# Patient Record
(demographics unavailable — no encounter records)

---

## 2024-10-10 NOTE — HISTORY OF PRESENT ILLNESS
[FreeTextEntry1] : F/u for multiple medical issues  *** Oct 10, 2024 ***  feels ok overall , except for seeing more low numbers recently on and off stomach discomfort on Mounjaro Tresiba 28 un HS,  Tenzin ac B (9-32-75-14-15-16-17-18-19), ac L- (3-8-7-10-11-12-13-14-15), ac D (6-8-10-70-21-88-15-16-17), Farxiga 10 mg qd, Mounjaro 5 mg qw , Synthroid 75 mcg qd wearing Mgada Date of download:  10/10/2024  Diabetes Medications and Dosage: as above Indication for CGMS: verify a change in the treatment regimen, identify periods of hypoglycemia/ hyperglycemia.  Modal day report: pattern.  Pt with HYPO  % of the time ( NONE below 54),  % in target range Hyperglycemia:  % elevation  Identified issues: carbohydrate counting dates analyzed:  - 10/10/2024  *** Jul 10, 2024 ***  taking Tresiba 28 un HS, Tenzin ac B (5-86-47-14-15-16-17-18-19), ac L- (6-8-3-10-11-12-13-14-15), ac D (6-8-10-39-72-81-15-16-17),  Farxiga 10 mg qd, Mounjaro 5 mg qw , Synthroid 75 mcg wearing Magda 3 Date of download:  07/10/2024  Diabetes Medications and Dosage: as above Indication for CGMS: verify a change in the treatment regimen, identify periods of hypoglycemia/ hyperglycemia.  Modal day report: pattern.  Pt with HYPO  0% of the time ( NONE below 54),  84% in target range Hyperglycemia:  16% elevation  Identified issues: carbohydrate counting dates analyzed:  07/06/2024- 07/10/2024  DXA (5/8/24)_ LS (-0.3), FN (-0.6), radius 33% (-0.1). FRAX- 9.3% and 1.7% Thyroid US (5/8/24)- enlarged thyroid. with multiple b/l mostly spongiform nodules. Dominant LMP spongi 2.1x1.9x1.9 (TR1)  *** Apr 10, 2024 ***  doing well. denies any new c/o staying Tresiba 28 un HS,  Lyumjev ac B (6-16-61-14-15-16-17-18-19), ac L- (5-6-5-10-11-12-13-14-15), ac D (6-8-10-64-94-59-15-16-17),  Farxiga 10 mg qd , Mounjaro 2.5 mg qw tolerates Mounjaro well wearing Magda Date of download:  04/10/2024  Diabetes Medications and Dosage: as above Indication for CGMS: verify a change in the treatment regimen, identify periods of hypoglycemia/ hyperglycemia.  Modal day report: pattern.  Pt with HYPO  0% of the time ( NONE below 54),  84% in target range Hyperglycemia:  16% elevation  Identified issues: carbohydrate counting dates analyzed: 03/27/2024 - 04/10/2024  *** Oct 25, 2023 ***  doing ok overall, more nausea on Ozempic and feels that tolerates it less taking Tresiba 28 un HS,  Lyumjev ac B (4-04-89-14-15-16-17-18-19), ac L- (1-0-7-10-11-12-13-14-15), ac D (6-8-10-05-27-90-15-16-17), Farxiga 10 mg qd ,  Ozempic 1 mg qw, Synthroid 75 mcg wearing Magda Date of download:  10/25/2023  Diabetes Medications and Dosage: as above Indication for CGMS: verify a change in the treatment regimen, identify periods of hypoglycemia/ hyperglycemia.  Modal day report: pattern.  Pt with HYPO  0% of the time ( NONE below 54),  89% in target range Hyperglycemia:  11% elevation  Identified issues: carbohydrate counting dates analyzed: 10/12/2023 - 10/25/2023   *** Jul 11, 2023 ***  doing well. no new c/o staying on  Tresiba 28  un HS,  Lyumjev ac B  (8-10-13-14-15-16-17-18-19), ac L- (8-2-4-10-11-12-13-14-15), ac D (6-8-10-07-07-52-15-16-17),  Farxiga 10 mg qd ,  Ozempic 1 mg qw, Synthroid 75 mcg  using Magda 2, but forgot her reader reports fbs- 110's, ppg- 140's denies hypo's  *** Mar 22, 2023 ***  feels well, no new c/o taking  Tresiba 28  un HS,  Lyumjev ac B  (2-4-02-25-28-15-15-16-17), ac L- 8-1-3-10-11-12-13-14-15), ac D (8-10-13-15-16-17-18-19-20),  Farxiga 10 mg qd ,  Ozempic 1 mg qw  Date of download:  03/22/2023  Diabetes Medications and Dosage: as above Indication for CGMS: verify a change in the treatment regimen, identify periods of hypoglycemia/ hyperglycemia.  Modal day report: pattern.  Pt with HYPO  0% of the time ( NONE below 54),  74% in target range Hyperglycemia:  26% elevation  Identified issues: carbohydrate counting dates analyzed:03/13/2023  - 03/22/2023   Thyr US(12/12/22)-  diffusely heterogenous thyroid gland with numerous spongiform nodules, incl LMP 2.2x1.9 cm nodule  *** Nov 23, 2022 ***  feels well, denies any c/o. lost some weight taking  Tresiba 26 un HS,  Lyumjev tid ac (7-9-10-46-35-01-15-16-17),  Farxiga 10 mg qd,  Ozempic 1 mg qw , L-thyroxine 75 mcg wearing Magda Date of download:  11/23/2022  Diabetes Medications and Dosage: as above Indication for CGMS: verify a change in the treatment regimen, identify periods of hypoglycemia/ hyperglycemia.  Modal day report: pattern.  Pt with HYPO  0% of the time ( NONE below 54),  71% in target range Hyperglycemia:  29% elevation  Identified issues: carbohydrate counting dates analyzed:  11/10/2022- 11/23/2022  no repeat sono yet  *** Jul 08, 2022 ***  feels fine, no new c/o tolerates ozempic well did not repeat thyroid sono yet on  Tresiba 22 un HS,  Lyumjev tid ac (0-2-2-10-12-13-14-15-16),  Farxiga 10 mg qd,  Ozempic 0.5 mg qw, Synthroid 75 mcg,  Lipitor 80 mg, Metoprolol 25 mg, Cozaar 50 mg, Procardia XL 30 mg  wearing Magda  Date of download:  7/8/22 Diabetes Medications and Dosage: as above Indication for CGMS: verify a change in the treatment regimen, identify periods of hypoglycemia/ hyperglycemia.  Modal day report: pattern.  Pt with HYPO  0% of the time (NONE below 54),  66% in target range Hyperglycemia:  34% elevation  Identified issues: carbohydrate counting dates analyzed: 6/25/22-7/8/22   HISTORY OF PRESENT ILLNESS.   Ms. BENTLEY was diagnosed with Diabetes Mellitus Type 2 at the age of 58. She reports history HTN, dyslipidemia, CRI,  CAD (s/p PTCI x 7), CHF, Hashimoto's hypothyroidism, MNG, and denies known complications of retinopathy, or neuropathy. She is presently on Lantus 15 un HS (syringes),  Novolog  9 un  tid ac meals (syringes), Farxiga 10 mg , Synthroid 75 mcg, Lipitor 80 mg, Metoprolol 25 mg, Cozaar 50 mg, Procardia XL 30 mg. She was under care of Dr. Salcedo's a while ago, and was taking upto 100 units of Lantus a day. She's wearing Magda   Date of download:  3/25/22 Diabetes Medications and Dosage: as above Indication for CGMS: verify a change in the treatment regimen, identify periods of hypoglycemia/ hyperglycemia.  Modal day report: pattern.  Pt with HYPO  0% of the time (NONE below 54),  49% in target range Hyperglycemia:  51% elevation  Identified issues: carbohydrate counting dates analyzed: 3/12/22-3/25/22  Fingerstick glucose in the office today is 227 mg/dL 4 hours after eating (eggs, muffin, coffee).  Diet: not following ADA Exercise: none  Lab review: none available  Thyroid US (1/26/17) -- enlarged thyroid, multiple b/l nodules, including RUP  0.9 x 0.6 x 0.6 cm complex nodule, RMP 0.8 x 0.6 x 0.5 cm isoechoic nodule, RMP 0.7 x 0.7 x 0.6 cm heterogeneous nodule. Dominant LMP 1.4 x 1.9 x 1.1 cm (smaller in size), isthmic 1.0x0.7x0.6 hypo. All are stable  FNA (12/10/15) of the LMP 1.8 cm nodule- adenomatoid nodule with old hemorrhage    Thyroid US (12/7/15)- enlarged thyroid,  multiple b/l nodules, including RUP hypo complex 0.7x0.5x0.5, RMP hypo 0.8x0.7x0.7, dominant LMP iso solid 1.8x0.9x1.5, isthmic hypo solid 1.1x0.7x0.9   Last dilated eye - 01/24 Last podiatry visit  - 7/23 Last cardiology evaluation - Dr. Salvador ([facility]), 5/23 Last stress test - 12/21 Last 2-D Echo - 12/21 Last nephrology evaluation - 3/22 (Dr. Gavin Smalls) Last neurology evaluation- none

## 2024-10-10 NOTE — ASSESSMENT
[Diabetes Foot Care] : diabetes foot care [Long Term Vascular Complications] : long term vascular complications of diabetes [Carbohydrate Consistent Diet] : carbohydrate consistent diet [Importance of Diet and Exercise] : importance of diet and exercise to improve glycemic control, achieve weight loss and improve cardiovascular health [Exercise/Effect on Glucose] : exercise/effect on glucose [Hypoglycemia Management] : hypoglycemia management [Glucagon Use] : glucagon use [Ketone Testing] : ketone testing [Action and use of Insulin] : action and use of short and long-acting insulin [Self Monitoring of Blood Glucose] : self monitoring of blood glucose [Insulin Self-Administration] : insulin self-administration [Injection Technique, Storage, Sharps Disposal] : injection technique, storage, and sharps disposal [Sick-Day Management] : sick-day management [Retinopathy Screening] : Patient was referred to ophthalmology for retinopathy screening [Diabetic Medications] : Risks and benefits of diabetic medications were discussed [FreeTextEntry1] : 1. T2DM, reasonably controlled - labs today - decrease Tresiba 22 un HS - Lyumjev ac B (3-0-95-13-14-15-16-17-18), ac L- (3-4-5-8-4-2-9-10-11), ac D (2-3-1-8-9-10-11-12-13) can add 3 un of Lyumjev for a bedtime dessert - Farxiga 10 mg qd as per renal - Mounjaro 5 mg qw  (R+B).she'll try injecting into thighs, will switch to ac dinner) and get back to me in a couple of weeks. If no improvement, will decrease the dose to 2.5 mg qw - monitor BP, urine microalbumin, lipids - patient has decided against an insulin pump.  2. Hypothyroidism Proper intake of levothyroxine is reviewed in details, including on an empty stomach, with water only, at least one hour before taking any medications, vitamins, or supplements and three-four hours before taking iron or calcium. - Synthroid 75 mcg, pending labs  3. MNG - s/p benign FNA of the dominant left thyroid nodule - overall stable sono; will repeat thyroid US in 05/26 (2 yrs)  4. Screening for osteoporosis - calcium 500 mg bid, extra OTC vitamin D3 2,000 IU/day - continue weight-bearing exercises;  - DXA 3 sites in 05/26  RTC 3 mos

## 2025-01-23 NOTE — ASSESSMENT
[Diabetes Foot Care] : diabetes foot care [Long Term Vascular Complications] : long term vascular complications of diabetes [Carbohydrate Consistent Diet] : carbohydrate consistent diet [Importance of Diet and Exercise] : importance of diet and exercise to improve glycemic control, achieve weight loss and improve cardiovascular health [Exercise/Effect on Glucose] : exercise/effect on glucose [Hypoglycemia Management] : hypoglycemia management [Glucagon Use] : glucagon use [Ketone Testing] : ketone testing [Action and use of Insulin] : action and use of short and long-acting insulin [Self Monitoring of Blood Glucose] : self monitoring of blood glucose [Injection Technique, Storage, Sharps Disposal] : injection technique, storage, and sharps disposal [Insulin Self-Administration] : insulin self-administration [Sick-Day Management] : sick-day management [Retinopathy Screening] : Patient was referred to ophthalmology for retinopathy screening [Diabetic Medications] : Risks and benefits of diabetic medications were discussed [FreeTextEntry1] : 1. T2DM, suboptimally controlled - labs today - decrease Tresiba 24 un HS - Lyumjev ac B (7-67-62-15-17-18-19-20-21), ac L- (3-4-5-9-1-4-9-10-11), ac D (1-6-0-9-10-11-12-13-14) can add 3 un of Lyumjev for a bedtime dessert - Farxiga 10 mg qd as per renal - decrease Mounjaro 2.5 mg qw  (R+B).she'll try injecting into thighs ,instructed to get back to me in a couple of weeks  - monitor BP, urine microalbumin, lipids - patient has decided against an insulin pump.  2. Hypothyroidism Proper intake of levothyroxine is reviewed in details, including on an empty stomach, with water only, at least one hour before taking any medications, vitamins, or supplements and three-four hours before taking iron or calcium. - Synthroid 75 mcg, pending labs  3. MNG - s/p benign FNA of the dominant left thyroid nodule - overall stable sono; will repeat thyroid US in 05/26 (2 yrs)  4. Screening for osteoporosis - calcium 500 mg bid, extra OTC vitamin D3 2,000 IU/day - continue weight-bearing exercises;  - DXA 3 sites in 05/26  RTC 3 mos

## 2025-01-23 NOTE — HISTORY OF PRESENT ILLNESS
[FreeTextEntry1] : F/u for multiple medical issues  *** Jan 23, 2025 ***  taking Tresiba 22 un HS,  Lyumjev ac B (6-1-08-13-14-15-16-17-18), ac L- (3-4-5-2-2-7-9-10-11), ac D (3-6-5-8-9-10-11-12-13), Farxiga 10 mg qd, Mounjaro 5 mg qw , Synthroid 75 mcg qd,  still injecting into stomach (did not try thighs). still with on and off nausea, diarrhea  wearing Magda Date of download:  01/23/2025  Diabetes Medications and Dosage: as above Indication for CGMS: verify a change in the treatment regimen, identify periods of hypoglycemia/ hyperglycemia.  Modal day report: pattern.  Pt with HYPO  1% of the time ( NONE below 54),  76% in target range Hyperglycemia:  23% elevation  Identified issues: carbohydrate counting dates analyzed: 01/10/2025 - 01/23/2025  *** Oct 10, 2024 ***  feels ok overall , except for seeing more low numbers recently on and off stomach discomfort on Mounjaro Tresiba 28 un HS,  Chrisv ac B (5-43-71-14-15-16-17-18-19), ac L- (0-7-0-10-11-12-13-14-15), ac D (6-8-10-82-20-47-15-16-17), Farxiga 10 mg qd, Mounjaro 5 mg qw , Synthroid 75 mcg qd wearing Magda Date of download:  10/10/2024  Diabetes Medications and Dosage: as above Indication for CGMS: verify a change in the treatment regimen, identify periods of hypoglycemia/ hyperglycemia.  Modal day report: pattern.  Pt with HYPO  % of the time ( NONE below 54),  % in target range Hyperglycemia:  % elevation  Identified issues: carbohydrate counting dates analyzed:  - 10/10/2024  *** Jul 10, 2024 ***  taking  Tresiba 28 un HS (did not decrease the dose),  Lyumjev ac B (8-2-87-13-14-15-16-17-18), ac L- (3-4-5-7-4-3-9-10-11), ac D (5-9-1-8-9-10-11-12-13), Farxiga 10 mg qd, Mounjaro 5 mg qw , Synthroid 75 mcg wearing Magda 3 Date of download:  07/10/2024  Diabetes Medications and Dosage: as above Indication for CGMS: verify a change in the treatment regimen, identify periods of hypoglycemia/ hyperglycemia.  Modal day report: pattern.  Pt with HYPO  0% of the time ( NONE below 54),  84% in target range Hyperglycemia:  16% elevation  Identified issues: carbohydrate counting dates analyzed:  07/06/2024- 07/10/2024  DXA (5/8/24)_ LS (-0.3), FN (-0.6), radius 33% (-0.1). FRAX- 9.3% and 1.7% Thyroid US (5/8/24)- enlarged thyroid. with multiple b/l mostly spongiform nodules. Dominant LMP spongi 2.1x1.9x1.9 (TR1)  *** Apr 10, 2024 ***  doing well. denies any new c/o staying Tresiba 28 un HS,  Lyumjev ac B (4-55-10-14-15-16-17-18-19), ac L- (4-4-5-10-11-12-13-14-15), ac D (6-8-10-07-88-27-15-16-17),  Farxiga 10 mg qd , Mounjaro 2.5 mg qw tolerates Mounjaro well wearing Magda Date of download:  04/10/2024  Diabetes Medications and Dosage: as above Indication for CGMS: verify a change in the treatment regimen, identify periods of hypoglycemia/ hyperglycemia.  Modal day report: pattern.  Pt with HYPO  0% of the time ( NONE below 54),  84% in target range Hyperglycemia:  16% elevation  Identified issues: carbohydrate counting dates analyzed: 03/27/2024 - 04/10/2024  *** Oct 25, 2023 ***  doing ok overall, more nausea on Ozempic and feels that tolerates it less taking Tresiba 28 un HS,  Lyumjev ac B (8-58-26-14-15-16-17-18-19), ac L- (8-8-5-10-11-12-13-14-15), ac D (6-8-10-25-08-06-15-16-17), Farxiga 10 mg qd ,  Ozempic 1 mg qw, Synthroid 75 mcg wearing Magda Date of download:  10/25/2023  Diabetes Medications and Dosage: as above Indication for CGMS: verify a change in the treatment regimen, identify periods of hypoglycemia/ hyperglycemia.  Modal day report: pattern.  Pt with HYPO  0% of the time ( NONE below 54),  89% in target range Hyperglycemia:  11% elevation  Identified issues: carbohydrate counting dates analyzed: 10/12/2023 - 10/25/2023   *** Jul 11, 2023 ***  doing well. no new c/o staying on  Tresiba 28  un HS,  Lyumjev ac B  (8-10-13-14-15-16-17-18-19), ac L- (5-1-1-10-11-12-13-14-15), ac D (6-8-10-40-79-69-15-16-17),  Farxiga 10 mg qd ,  Ozempic 1 mg qw, Synthroid 75 mcg  using Magda 2, but forgot her reader reports fbs- 110's, ppg- 140's denies hypo's  *** Mar 22, 2023 ***  feels well, no new c/o taking  Tresiba 28  un HS,  Lyumjev ac B  (7-4-67-88-93-31-15-16-17), ac L- 4-6-5-10-11-12-13-14-15), ac D (8-10-13-15-16-17-18-19-20),  Farxiga 10 mg qd ,  Ozempic 1 mg qw  Date of download:  03/22/2023  Diabetes Medications and Dosage: as above Indication for CGMS: verify a change in the treatment regimen, identify periods of hypoglycemia/ hyperglycemia.  Modal day report: pattern.  Pt with HYPO  0% of the time ( NONE below 54),  74% in target range Hyperglycemia:  26% elevation  Identified issues: carbohydrate counting dates analyzed:03/13/2023  - 03/22/2023   Thyr US(12/12/22)-  diffusely heterogenous thyroid gland with numerous spongiform nodules, incl LMP 2.2x1.9 cm nodule  *** Nov 23, 2022 ***  feels well, denies any c/o. lost some weight taking  Tresiba 26 un HS,  Lyumjev tid ac (7-9-10-53-41-31-15-16-17),  Farxiga 10 mg qd,  Ozempic 1 mg qw , L-thyroxine 75 mcg wearing Magda Date of download:  11/23/2022  Diabetes Medications and Dosage: as above Indication for CGMS: verify a change in the treatment regimen, identify periods of hypoglycemia/ hyperglycemia.  Modal day report: pattern.  Pt with HYPO  0% of the time ( NONE below 54),  71% in target range Hyperglycemia:  29% elevation  Identified issues: carbohydrate counting dates analyzed:  11/10/2022- 11/23/2022  no repeat sono yet  *** Jul 08, 2022 ***  feels fine, no new c/o tolerates ozempic well did not repeat thyroid sono yet on  Tresiba 22 un HS,  Lyumjev tid ac (2-1-8-10-12-13-14-15-16),  Farxiga 10 mg qd,  Ozempic 0.5 mg qw, Synthroid 75 mcg,  Lipitor 80 mg, Metoprolol 25 mg, Cozaar 50 mg, Procardia XL 30 mg  wearing Magda  Date of download:  7/8/22 Diabetes Medications and Dosage: as above Indication for CGMS: verify a change in the treatment regimen, identify periods of hypoglycemia/ hyperglycemia.  Modal day report: pattern.  Pt with HYPO  0% of the time (NONE below 54),  66% in target range Hyperglycemia:  34% elevation  Identified issues: carbohydrate counting dates analyzed: 6/25/22-7/8/22   HISTORY OF PRESENT ILLNESS.   Ms. BENTLEY was diagnosed with Diabetes Mellitus Type 2 at the age of 58. She reports history HTN, dyslipidemia, CRI,  CAD (s/p PTCI x 7), CHF, Hashimoto's hypothyroidism, MNG, and denies known complications of retinopathy, or neuropathy. She is presently on Lantus 15 un HS (syringes),  Novolog  9 un  tid ac meals (syringes), Farxiga 10 mg , Synthroid 75 mcg, Lipitor 80 mg, Metoprolol 25 mg, Cozaar 50 mg, Procardia XL 30 mg. She was under care of Dr. Salcedo's a while ago, and was taking upto 100 units of Lantus a day. She's wearing Magda   Date of download:  3/25/22 Diabetes Medications and Dosage: as above Indication for CGMS: verify a change in the treatment regimen, identify periods of hypoglycemia/ hyperglycemia.  Modal day report: pattern.  Pt with HYPO  0% of the time (NONE below 54),  49% in target range Hyperglycemia:  51% elevation  Identified issues: carbohydrate counting dates analyzed: 3/12/22-3/25/22  Fingerstick glucose in the office today is 227 mg/dL 4 hours after eating (eggs, muffin, coffee).  Diet: not following ADA Exercise: none  Lab review: none available  Thyroid US (1/26/17) -- enlarged thyroid, multiple b/l nodules, including RUP  0.9 x 0.6 x 0.6 cm complex nodule, RMP 0.8 x 0.6 x 0.5 cm isoechoic nodule, RMP 0.7 x 0.7 x 0.6 cm heterogeneous nodule. Dominant LMP 1.4 x 1.9 x 1.1 cm (smaller in size), isthmic 1.0x0.7x0.6 hypo. All are stable  FNA (12/10/15) of the LMP 1.8 cm nodule- adenomatoid nodule with old hemorrhage    Thyroid US (12/7/15)- enlarged thyroid,  multiple b/l nodules, including RUP hypo complex 0.7x0.5x0.5, RMP hypo 0.8x0.7x0.7, dominant LMP iso solid 1.8x0.9x1.5, isthmic hypo solid 1.1x0.7x0.9   Last dilated eye - 01/24 (Dr. Rajput) , to see retina in 02/25 (Dr. Brooks) Last podiatry visit  - 7/23 Last cardiology evaluation - Dr. Salvador ([facility]), 5/23 Last stress test - 12/21 Last 2-D Echo - 12/21 Last nephrology evaluation - 3/22 (Dr. Gavin Smalls) Last neurology evaluation- none

## 2025-03-26 NOTE — ASSESSMENT
[Diabetes Foot Care] : diabetes foot care [Long Term Vascular Complications] : long term vascular complications of diabetes [Carbohydrate Consistent Diet] : carbohydrate consistent diet [Importance of Diet and Exercise] : importance of diet and exercise to improve glycemic control, achieve weight loss and improve cardiovascular health [Exercise/Effect on Glucose] : exercise/effect on glucose [Hypoglycemia Management] : hypoglycemia management [Glucagon Use] : glucagon use [Ketone Testing] : ketone testing [Action and use of Insulin] : action and use of short and long-acting insulin [Self Monitoring of Blood Glucose] : self monitoring of blood glucose [Insulin Self-Administration] : insulin self-administration [Injection Technique, Storage, Sharps Disposal] : injection technique, storage, and sharps disposal [Sick-Day Management] : sick-day management [Retinopathy Screening] : Patient was referred to ophthalmology for retinopathy screening [Diabetic Medications] : Risks and benefits of diabetic medications were discussed [FreeTextEntry1] : 1. T2DM, suboptimally controlled - labs today - Tresiba 24 un HS - Lyumjev ac B (10-13-15-57-51-09-22-23-24), ac L- (3-4-5-3-8-8-9-10-11), ac D (8-9-10-41-58-43-14-15-16) can add 3 un of Lyumjev for a bedtime dessert - should inject prandial insulin BEFORE meals - Farxiga 10 mg qd as per renal - can try Mounjaro 2.5 mg qow  (R+B).she'll try injecting into thighs ,instructed to get back to me in a couple of weeks  - US abdomen and see GI - monitor BP, urine microalbumin, lipids - patient has decided against an insulin pump.  2. Hypothyroidism Proper intake of levothyroxine is reviewed in details, including on an empty stomach, with water only, at least one hour before taking any medications, vitamins, or supplements and three-four hours before taking iron or calcium. - Synthroid 75 mcg 6/wk, pending labs  3. MNG - s/p benign FNA of the dominant left thyroid nodule - overall stable sono; will repeat thyroid US in 05/26 (2 yrs)  4. Screening for osteoporosis - calcium 500 mg bid, extra OTC vitamin D3 2,000 IU/day - continue weight-bearing exercises;  - DXA 3 sites in 05/26  RTC as scheduled

## 2025-03-26 NOTE — HISTORY OF PRESENT ILLNESS
[FreeTextEntry1] : F/u for multiple medical issues  *** Mar 26, 2025 ***  saw renal, Dr. Smalls- creatinine rising to 3.2. Kerendia was stopped, Entresto changed to 24-26 bid labs from 2/3/25- TSH- 0.09, FT4- 1.34, creat- 2.7, LDL- 106 alternating constipation and diarrhea. some generalized abd discomfort on Tresiba 24 un HS,  Lyumjev ac B (3-46-41-15-17-18-19-20-21), ac L- (3-4-5-7-1-6-9-10-11), ac D (2-0-8-9-10-11-12-13-14), Farxiga 10 mg qd, Mounjaro 2.5 mg qw , Synthroid 75 mcg qd 6/wk ( dose decreased)  wearing Magda Date of download:  03/26/2025  Diabetes Medications and Dosage: as above Indication for CGMS: verify a change in the treatment regimen, identify periods of hypoglycemia/ hyperglycemia.  Modal day report: pattern.  Pt with HYPO  1% of the time ( NONE below 54),  77% in target range Hyperglycemia: 1% elevation  Identified issues: carbohydrate counting. late injections  (post meals) dates analyzed: 03/13/2025 - 03/26/2025  *** Jan 23, 2025 ***  taking Tresiba 22 un HS,  Lyumjev ac B (5-0-35-13-14-15-16-17-18), ac L- (3-4-5-6-6-1-9-10-11), ac D (7-2-1-8-9-10-11-12-13), Farxiga 10 mg qd, Mounjaro 5 mg qw , Synthroid 75 mcg qd,  still injecting into stomach (did not try thighs). still with on and off nausea, diarrhea  wearing Magda Date of download:  01/23/2025  Diabetes Medications and Dosage: as above Indication for CGMS: verify a change in the treatment regimen, identify periods of hypoglycemia/ hyperglycemia.  Modal day report: pattern.  Pt with HYPO  1% of the time ( NONE below 54),  76% in target range Hyperglycemia:  23% elevation  Identified issues: carbohydrate counting dates analyzed: 01/10/2025 - 01/23/2025  *** Oct 10, 2024 ***  feels ok overall , except for seeing more low numbers recently on and off stomach discomfort on Mounjaro Tresiba 28 un HS,  Lysierrav ac B (0-34-83-14-15-16-17-18-19), ac L- (2-7-8-10-11-12-13-14-15), ac D (6-8-10-28-62-72-15-16-17), Farxiga 10 mg qd, Mounjaro 5 mg qw , Synthroid 75 mcg qd wearing Magda Date of download:  10/10/2024  Diabetes Medications and Dosage: as above Indication for CGMS: verify a change in the treatment regimen, identify periods of hypoglycemia/ hyperglycemia.  Modal day report: pattern.  Pt with HYPO  % of the time ( NONE below 54),  % in target range Hyperglycemia:  % elevation  Identified issues: carbohydrate counting dates analyzed:  - 10/10/2024  *** Jul 10, 2024 ***  taking  Tresiba 28 un HS (did not decrease the dose),  Lyumjev ac B (0-7-00-13-14-15-16-17-18), ac L- (3-4-5-3-2-3-9-10-11), ac D (1-1-4-8-9-10-11-12-13), Farxiga 10 mg qd, Mounjaro 5 mg qw , Synthroid 75 mcg wearing Magda 3 Date of download:  07/10/2024  Diabetes Medications and Dosage: as above Indication for CGMS: verify a change in the treatment regimen, identify periods of hypoglycemia/ hyperglycemia.  Modal day report: pattern.  Pt with HYPO  0% of the time ( NONE below 54),  84% in target range Hyperglycemia:  16% elevation  Identified issues: carbohydrate counting dates analyzed:  07/06/2024- 07/10/2024  DXA (5/8/24)_ LS (-0.3), FN (-0.6), radius 33% (-0.1). FRAX- 9.3% and 1.7% Thyroid US (5/8/24)- enlarged thyroid. with multiple b/l mostly spongiform nodules. Dominant LMP spongi 2.1x1.9x1.9 (TR1)  *** Apr 10, 2024 ***  doing well. denies any new c/o staying Tresiba 28 un HS,  Lyumjev ac B (7-22-94-14-15-16-17-18-19), ac L- (4-0-1-10-11-12-13-14-15), ac D (6-8-10-54-64-27-15-16-17),  Farxiga 10 mg qd , Mounjaro 2.5 mg qw tolerates Mounjaro well wearing Magda Date of download:  04/10/2024  Diabetes Medications and Dosage: as above Indication for CGMS: verify a change in the treatment regimen, identify periods of hypoglycemia/ hyperglycemia.  Modal day report: pattern.  Pt with HYPO  0% of the time ( NONE below 54),  84% in target range Hyperglycemia:  16% elevation  Identified issues: carbohydrate counting dates analyzed: 03/27/2024 - 04/10/2024  *** Oct 25, 2023 ***  doing ok overall, more nausea on Ozempic and feels that tolerates it less taking Tresiba 28 un HS,  Lyumjev ac B (0-77-43-14-15-16-17-18-19), ac L- (9-9-0-10-11-12-13-14-15), ac D (6-8-10-98-05-05-15-16-17), Farxiga 10 mg qd ,  Ozempic 1 mg qw, Synthroid 75 mcg wearing Magda Date of download:  10/25/2023  Diabetes Medications and Dosage: as above Indication for CGMS: verify a change in the treatment regimen, identify periods of hypoglycemia/ hyperglycemia.  Modal day report: pattern.  Pt with HYPO  0% of the time ( NONE below 54),  89% in target range Hyperglycemia:  11% elevation  Identified issues: carbohydrate counting dates analyzed: 10/12/2023 - 10/25/2023   *** Jul 11, 2023 ***  doing well. no new c/o staying on  Tresiba 28  un HS,  Lyumjev ac B  (8-10-13-14-15-16-17-18-19), ac L- (5-2-4-10-11-12-13-14-15), ac D (6-8-10-68-21-41-15-16-17),  Farxiga 10 mg qd ,  Ozempic 1 mg qw, Synthroid 75 mcg  using Magda 2, but forgot her reader reports fbs- 110's, ppg- 140's denies hypo's  *** Mar 22, 2023 ***  feels well, no new c/o taking  Tresiba 28  un HS,  Lyumjev ac B  (3-3-91-58-73-70-15-16-17), ac L- 5-5-2-10-11-12-13-14-15), ac D (8-10-13-15-16-17-18-19-20),  Farxiga 10 mg qd ,  Ozempic 1 mg qw  Date of download:  03/22/2023  Diabetes Medications and Dosage: as above Indication for CGMS: verify a change in the treatment regimen, identify periods of hypoglycemia/ hyperglycemia.  Modal day report: pattern.  Pt with HYPO  0% of the time ( NONE below 54),  74% in target range Hyperglycemia:  26% elevation  Identified issues: carbohydrate counting dates analyzed:03/13/2023  - 03/22/2023   Thyr US(12/12/22)-  diffusely heterogenous thyroid gland with numerous spongiform nodules, incl LMP 2.2x1.9 cm nodule  *** Nov 23, 2022 ***  feels well, denies any c/o. lost some weight taking  Tresiba 26 un HS,  Lyumjev tid ac (7-9-10-75-74-09-15-16-17),  Farxiga 10 mg qd,  Ozempic 1 mg qw , L-thyroxine 75 mcg wearing Magda Date of download:  11/23/2022  Diabetes Medications and Dosage: as above Indication for CGMS: verify a change in the treatment regimen, identify periods of hypoglycemia/ hyperglycemia.  Modal day report: pattern.  Pt with HYPO  0% of the time ( NONE below 54),  71% in target range Hyperglycemia:  29% elevation  Identified issues: carbohydrate counting dates analyzed:  11/10/2022- 11/23/2022  no repeat sono yet  *** Jul 08, 2022 ***  feels fine, no new c/o tolerates ozempic well did not repeat thyroid sono yet on  Tresiba 22 un HS,  Lyumjev tid ac (0-2-6-10-12-13-14-15-16),  Farxiga 10 mg qd,  Ozempic 0.5 mg qw, Synthroid 75 mcg,  Lipitor 80 mg, Metoprolol 25 mg, Cozaar 50 mg, Procardia XL 30 mg  wearing Magda  Date of download:  7/8/22 Diabetes Medications and Dosage: as above Indication for CGMS: verify a change in the treatment regimen, identify periods of hypoglycemia/ hyperglycemia.  Modal day report: pattern.  Pt with HYPO  0% of the time (NONE below 54),  66% in target range Hyperglycemia:  34% elevation  Identified issues: carbohydrate counting dates analyzed: 6/25/22-7/8/22   HISTORY OF PRESENT ILLNESS.   Ms. BENTLEY was diagnosed with Diabetes Mellitus Type 2 at the age of 58. She reports history HTN, dyslipidemia, CRI,  CAD (s/p PTCI x 7), CHF, Hashimoto's hypothyroidism, MNG, and denies known complications of retinopathy, or neuropathy. She is presently on Lantus 15 un HS (syringes),  Novolog  9 un  tid ac meals (syringes), Farxiga 10 mg , Synthroid 75 mcg, Lipitor 80 mg, Metoprolol 25 mg, Cozaar 50 mg, Procardia XL 30 mg. She was under care of Dr. Salcedo's a while ago, and was taking upto 100 units of Lantus a day. She's wearing Magda   Date of download:  3/25/22 Diabetes Medications and Dosage: as above Indication for CGMS: verify a change in the treatment regimen, identify periods of hypoglycemia/ hyperglycemia.  Modal day report: pattern.  Pt with HYPO  0% of the time (NONE below 54),  49% in target range Hyperglycemia:  51% elevation  Identified issues: carbohydrate counting dates analyzed: 3/12/22-3/25/22  Fingerstick glucose in the office today is 227 mg/dL 4 hours after eating (eggs, muffin, coffee).  Diet: not following ADA Exercise: none  Lab review: none available  Thyroid US (1/26/17) -- enlarged thyroid, multiple b/l nodules, including RUP  0.9 x 0.6 x 0.6 cm complex nodule, RMP 0.8 x 0.6 x 0.5 cm isoechoic nodule, RMP 0.7 x 0.7 x 0.6 cm heterogeneous nodule. Dominant LMP 1.4 x 1.9 x 1.1 cm (smaller in size), isthmic 1.0x0.7x0.6 hypo. All are stable  FNA (12/10/15) of the LMP 1.8 cm nodule- adenomatoid nodule with old hemorrhage    Thyroid US (12/7/15)- enlarged thyroid,  multiple b/l nodules, including RUP hypo complex 0.7x0.5x0.5, RMP hypo 0.8x0.7x0.7, dominant LMP iso solid 1.8x0.9x1.5, isthmic hypo solid 1.1x0.7x0.9   Last dilated eye - 01/24 (Dr. Rajput) , 02/25 (Dr. Brooks) Last podiatry visit  - 7/23 Last cardiology evaluation - Dr. Salvador ([facility]), 5/23 Last stress test - 12/21 Last 2-D Echo - 12/21 Last nephrology evaluation - 3/25 (Dr. Gavin Smalls) Last neurology evaluation- none

## 2025-07-02 NOTE — ASSESSMENT
[Diabetes Foot Care] : diabetes foot care [Long Term Vascular Complications] : long term vascular complications of diabetes [Carbohydrate Consistent Diet] : carbohydrate consistent diet [Importance of Diet and Exercise] : importance of diet and exercise to improve glycemic control, achieve weight loss and improve cardiovascular health [Exercise/Effect on Glucose] : exercise/effect on glucose [Hypoglycemia Management] : hypoglycemia management [Glucagon Use] : glucagon use [Ketone Testing] : ketone testing [Action and use of Insulin] : action and use of short and long-acting insulin [Self Monitoring of Blood Glucose] : self monitoring of blood glucose [Insulin Self-Administration] : insulin self-administration [Injection Technique, Storage, Sharps Disposal] : injection technique, storage, and sharps disposal [Sick-Day Management] : sick-day management [Retinopathy Screening] : Patient was referred to ophthalmology for retinopathy screening [Diabetic Medications] : Risks and benefits of diabetic medications were discussed [FreeTextEntry1] : 1. T2DM, suboptimally controlled - labs today - decrease Tresiba 20 un HS - Lyumjev ac B (10-13-15-70-44-24-22-23-24), ac L- (3-4-5-1-3-0-9-10-11), ac D (8-9-10-55-65-23-14-15-16) can add 3 un of Lyumjev for a bedtime dessert - should inject prandial insulin BEFORE meals ( can move it to 15-20 ' before lunch sine it seems it's been acting slower at that time) - Farxiga 10 mg qd as per renal - cont Mounjaro 2.5 mg qow  (R+B).she'll try injecting into thighs ,instructed to get back to me in a couple of weeks  - US abdomen and f/u with GI - monitor BP, urine microalbumin, lipids - patient has decided against an insulin pump.  2. Hypothyroidism Proper intake of levothyroxine is reviewed in details, including on an empty stomach, with water only, at least one hour before taking any medications, vitamins, or supplements and three-four hours before taking iron or calcium. - Synthroid 75 mcg 6/wk, pending labs  3. MNG - s/p benign FNA of the dominant left thyroid nodule - overall stable sono; will repeat thyroid US in 05/26 (2 yrs)  4. Screening for osteoporosis - calcium 500 mg bid, extra OTC vitamin D3 2,000 IU/day - continue weight-bearing exercises;  - DXA 3 sites in 05/26  RTC 3 months

## 2025-07-02 NOTE — HISTORY OF PRESENT ILLNESS
[FreeTextEntry1] : F/u for multiple medical issues  *** Jul 02, 2025 ***  feels better saw a new GI- started on Xifaxan and doing better US abdomen (3/231/25)- Polycystic kidneys. 4 mm gallbladder wall polyp  on Tresiba 24 un HS,  Lyumjev ac B (10-13-15-22-42-10-22-23-24), ac L- (3-4-5-2-8-1-9-10-11), ac D (8-9-10-06-99-39-14-15-16), Farxiga 10 mg qd, Mounjaro 2.5 mg qow , Synthroid 75 mcg qd 6/wk  calcitriol added by renal  Name: Estephania Taylor YOB: 1940 Report Period: 06/19/2025 - 07/02/2025 (14 days) Generated: 07/02/2025 Time CGM Active: 96%  Glucose Statistics and Targets Average Glucose: 147 mg/dL Glucose Management Indicator (GMI): 6.8% Glucose Variability (%CV): 31.1% Target Range: 70 - 180 mg/dL  Time in Ranges Very High: >250 mg/dL --- 3% High: 181 - 250 mg/dL --- 21% Target Range: 70 - 180 mg/dL --- 75% Low: 54 - 69 mg/dL --- 1% Very Low: <54 mg/dL --- 0%   *** Mar 26, 2025 ***  saw renal, Dr. Smalls- creatinine rising to 3.2. Kerendia was stopped, Entresto changed to 24-26 bid labs from 2/3/25- TSH- 0.09, FT4- 1.34, creat- 2.7, LDL- 106 alternating constipation and diarrhea. some generalized abd discomfort on Tresiba 24 un HS,  Lyumjev ac B (8-11-13-15-17-18-19-20-21), ac L- (3-4-5-0-7-3-9-10-11), ac D (0-8-4-9-10-11-12-13-14), Farxiga 10 mg qd, Mounjaro 2.5 mg qw , Synthroid 75 mcg qd 6/wk ( dose decreased)  wearing Magda Date of download:  03/26/2025  Diabetes Medications and Dosage: as above Indication for CGMS: verify a change in the treatment regimen, identify periods of hypoglycemia/ hyperglycemia.  Modal day report: pattern.  Pt with HYPO  1% of the time ( NONE below 54),  77% in target range Hyperglycemia: 1% elevation  Identified issues: carbohydrate counting. late injections  (post meals) dates analyzed: 03/13/2025 - 03/26/2025  *** Jan 23, 2025 ***  taking Tresiba 22 un HS,  Lyumjev ac B (3-3-04-13-14-15-16-17-18), ac L- (3-4-5-3-5-1-9-10-11), ac D (6-5-4-8-9-10-11-12-13), Farxiga 10 mg qd, Mounjaro 5 mg qw , Synthroid 75 mcg qd,  still injecting into stomach (did not try thighs). still with on and off nausea, diarrhea  wearing Magda Date of download:  01/23/2025  Diabetes Medications and Dosage: as above Indication for CGMS: verify a change in the treatment regimen, identify periods of hypoglycemia/ hyperglycemia.  Modal day report: pattern.  Pt with HYPO  1% of the time ( NONE below 54),  76% in target range Hyperglycemia:  23% elevation  Identified issues: carbohydrate counting dates analyzed: 01/10/2025 - 01/23/2025  *** Oct 10, 2024 ***  feels ok overall , except for seeing more low numbers recently on and off stomach discomfort on Mounjaro Tresiba 28 un HS,  Lyumjev ac B (6-95-25-14-15-16-17-18-19), ac L- (2-7-2-10-11-12-13-14-15), ac D (6-8-10-81-16-91-15-16-17), Farxiga 10 mg qd, Mounjaro 5 mg qw , Synthroid 75 mcg qd wearing Magda Date of download:  10/10/2024  Diabetes Medications and Dosage: as above Indication for CGMS: verify a change in the treatment regimen, identify periods of hypoglycemia/ hyperglycemia.  Modal day report: pattern.  Pt with HYPO  % of the time ( NONE below 54),  % in target range Hyperglycemia:  % elevation  Identified issues: carbohydrate counting dates analyzed:  - 10/10/2024  *** Jul 10, 2024 ***  taking  Tresiba 28 un HS (did not decrease the dose),  Lyumjev ac B (9-9-72-13-14-15-16-17-18), ac L- (3-4-5-8-7-2-9-10-11), ac D (9-0-0-8-9-10-11-12-13), Farxiga 10 mg qd, Mounjaro 5 mg qw , Synthroid 75 mcg wearing Magda 3 Date of download:  07/10/2024  Diabetes Medications and Dosage: as above Indication for CGMS: verify a change in the treatment regimen, identify periods of hypoglycemia/ hyperglycemia.  Modal day report: pattern.  Pt with HYPO  0% of the time ( NONE below 54),  84% in target range Hyperglycemia:  16% elevation  Identified issues: carbohydrate counting dates analyzed:  07/06/2024- 07/10/2024  DXA (5/8/24)_ LS (-0.3), FN (-0.6), radius 33% (-0.1). FRAX- 9.3% and 1.7% Thyroid US (5/8/24)- enlarged thyroid. with multiple b/l mostly spongiform nodules. Dominant LMP spongi 2.1x1.9x1.9 (TR1)  *** Apr 10, 2024 ***  doing well. denies any new c/o staying Tresiba 28 un HS,  Lyumjev ac B (9-59-48-14-15-16-17-18-19), ac L- (4-4-4-10-11-12-13-14-15), ac D (6-8-10-65-57-90-15-16-17),  Farxiga 10 mg qd , Mounjaro 2.5 mg qw tolerates Mounjaro well wearing Magda Date of download:  04/10/2024  Diabetes Medications and Dosage: as above Indication for CGMS: verify a change in the treatment regimen, identify periods of hypoglycemia/ hyperglycemia.  Modal day report: pattern.  Pt with HYPO  0% of the time ( NONE below 54),  84% in target range Hyperglycemia:  16% elevation  Identified issues: carbohydrate counting dates analyzed: 03/27/2024 - 04/10/2024  *** Oct 25, 2023 ***  doing ok overall, more nausea on Ozempic and feels that tolerates it less taking Tresiba 28 un HS,  Lyumjev ac B (8-95-14-14-15-16-17-18-19), ac L- (2-6-4-10-11-12-13-14-15), ac D (6-8-10-11-33-99-15-16-17), Farxiga 10 mg qd ,  Ozempic 1 mg qw, Synthroid 75 mcg wearing Magda Date of download:  10/25/2023  Diabetes Medications and Dosage: as above Indication for CGMS: verify a change in the treatment regimen, identify periods of hypoglycemia/ hyperglycemia.  Modal day report: pattern.  Pt with HYPO  0% of the time ( NONE below 54),  89% in target range Hyperglycemia:  11% elevation  Identified issues: carbohydrate counting dates analyzed: 10/12/2023 - 10/25/2023   *** Jul 11, 2023 ***  doing well. no new c/o staying on  Tresiba 28  un HS,  Lyumjev ac B  (8-10-13-14-15-16-17-18-19), ac L- (8-6-2-10-11-12-13-14-15), ac D (6-8-10-39-65-99-15-16-17),  Farxiga 10 mg qd ,  Ozempic 1 mg qw, Synthroid 75 mcg  using Magda 2, but forgot her reader reports fbs- 110's, ppg- 140's denies hypo's  *** Mar 22, 2023 ***  feels well, no new c/o taking  Tresiba 28  un HS,  Lyumjev ac B  (8-3-53-23-60-91-15-16-17), ac L- 5-1-0-10-11-12-13-14-15), ac D (8-10-13-15-16-17-18-19-20),  Farxiga 10 mg qd ,  Ozempic 1 mg qw  Date of download:  03/22/2023  Diabetes Medications and Dosage: as above Indication for CGMS: verify a change in the treatment regimen, identify periods of hypoglycemia/ hyperglycemia.  Modal day report: pattern.  Pt with HYPO  0% of the time ( NONE below 54),  74% in target range Hyperglycemia:  26% elevation  Identified issues: carbohydrate counting dates analyzed:03/13/2023  - 03/22/2023   Thyr US(12/12/22)-  diffusely heterogenous thyroid gland with numerous spongiform nodules, incl LMP 2.2x1.9 cm nodule  *** Nov 23, 2022 ***  feels well, denies any c/o. lost some weight taking  Tresiba 26 un HS,  Lyumjev tid ac (7-9-10-42-11-62-15-16-17),  Farxiga 10 mg qd,  Ozempic 1 mg qw , L-thyroxine 75 mcg wearing Magda Date of download:  11/23/2022  Diabetes Medications and Dosage: as above Indication for CGMS: verify a change in the treatment regimen, identify periods of hypoglycemia/ hyperglycemia.  Modal day report: pattern.  Pt with HYPO  0% of the time ( NONE below 54),  71% in target range Hyperglycemia:  29% elevation  Identified issues: carbohydrate counting dates analyzed:  11/10/2022- 11/23/2022  no repeat sono yet  *** Jul 08, 2022 ***  feels fine, no new c/o tolerates ozempic well did not repeat thyroid sono yet on  Tresiba 22 un HS,  Lyumjev tid ac (2-0-7-10-12-13-14-15-16),  Farxiga 10 mg qd,  Ozempic 0.5 mg qw, Synthroid 75 mcg,  Lipitor 80 mg, Metoprolol 25 mg, Cozaar 50 mg, Procardia XL 30 mg  wearing Magda  Date of download:  7/8/22 Diabetes Medications and Dosage: as above Indication for CGMS: verify a change in the treatment regimen, identify periods of hypoglycemia/ hyperglycemia.  Modal day report: pattern.  Pt with HYPO  0% of the time (NONE below 54),  66% in target range Hyperglycemia:  34% elevation  Identified issues: carbohydrate counting dates analyzed: 6/25/22-7/8/22   HISTORY OF PRESENT ILLNESS.   Ms. TAYLOR was diagnosed with Diabetes Mellitus Type 2 at the age of 58. She reports history HTN, dyslipidemia, CRI,  CAD (s/p PTCI x 7), CHF, Hashimoto's hypothyroidism, MNG, and denies known complications of retinopathy, or neuropathy. She is presently on Lantus 15 un HS (syringes),  Novolog  9 un  tid ac meals (syringes), Farxiga 10 mg , Synthroid 75 mcg, Lipitor 80 mg, Metoprolol 25 mg, Cozaar 50 mg, Procardia XL 30 mg. She was under care of Dr. Salcedo's a while ago, and was taking upto 100 units of Lantus a day. She's wearing Magda   Date of download:  3/25/22 Diabetes Medications and Dosage: as above Indication for CGMS: verify a change in the treatment regimen, identify periods of hypoglycemia/ hyperglycemia.  Modal day report: pattern.  Pt with HYPO  0% of the time (NONE below 54),  49% in target range Hyperglycemia:  51% elevation  Identified issues: carbohydrate counting dates analyzed: 3/12/22-3/25/22  Fingerstick glucose in the office today is 227 mg/dL 4 hours after eating (eggs, muffin, coffee).  Diet: not following ADA Exercise: none  Lab review: none available  Thyroid US (1/26/17) -- enlarged thyroid, multiple b/l nodules, including RUP  0.9 x 0.6 x 0.6 cm complex nodule, RMP 0.8 x 0.6 x 0.5 cm isoechoic nodule, RMP 0.7 x 0.7 x 0.6 cm heterogeneous nodule. Dominant LMP 1.4 x 1.9 x 1.1 cm (smaller in size), isthmic 1.0x0.7x0.6 hypo. All are stable  FNA (12/10/15) of the LMP 1.8 cm nodule- adenomatoid nodule with old hemorrhage    Thyroid US (12/7/15)- enlarged thyroid,  multiple b/l nodules, including RUP hypo complex 0.7x0.5x0.5, RMP hypo 0.8x0.7x0.7, dominant LMP iso solid 1.8x0.9x1.5, isthmic hypo solid 1.1x0.7x0.9   Last dilated eye - 01/24 (Dr. Rajput) , 02/25 (Dr. Brooks) Last podiatry visit  - 7/23 Last cardiology evaluation - Dr Sotelo ( 6/25), prev  Dr. Salvador ([facility]) Last stress test - 12/21 Last 2-D Echo - 6/25 Last nephrology evaluation - 5/25 (Dr. Gavin Smalls) Last neurology evaluation- none